# Patient Record
Sex: MALE | Race: OTHER | URBAN - METROPOLITAN AREA
[De-identification: names, ages, dates, MRNs, and addresses within clinical notes are randomized per-mention and may not be internally consistent; named-entity substitution may affect disease eponyms.]

---

## 2018-01-27 ENCOUNTER — EMERGENCY (EMERGENCY)
Facility: HOSPITAL | Age: 43
LOS: 1 days | Discharge: HOME | End: 2018-01-27
Attending: EMERGENCY MEDICINE | Admitting: EMERGENCY MEDICINE
Payer: SELF-PAY

## 2018-01-27 VITALS
HEART RATE: 74 BPM | OXYGEN SATURATION: 100 % | WEIGHT: 147.49 LBS | DIASTOLIC BLOOD PRESSURE: 81 MMHG | TEMPERATURE: 97 F | SYSTOLIC BLOOD PRESSURE: 128 MMHG | RESPIRATION RATE: 18 BRPM

## 2018-01-27 DIAGNOSIS — S61.217A LACERATION WITHOUT FOREIGN BODY OF LEFT LITTLE FINGER WITHOUT DAMAGE TO NAIL, INITIAL ENCOUNTER: ICD-10-CM

## 2018-01-27 DIAGNOSIS — F17.200 NICOTINE DEPENDENCE, UNSPECIFIED, UNCOMPLICATED: ICD-10-CM
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
## 2018-01-27 PROCEDURE — 90715 TDAP VACCINE 7 YRS/> IM: CPT

## 2018-01-27 PROCEDURE — 99283 EMERGENCY DEPT VISIT LOW MDM: CPT | Mod: 25

## 2018-01-27 PROCEDURE — 73140 X-RAY EXAM OF FINGER(S): CPT | Mod: 26,LT

## 2018-01-27 PROCEDURE — 99284 EMERGENCY DEPT VISIT MOD MDM: CPT

## 2018-01-27 PROCEDURE — 90471 IMMUNIZATION ADMIN: CPT

## 2018-01-27 PROCEDURE — 73140 X-RAY EXAM OF FINGER(S): CPT

## 2018-01-27 RX ORDER — IBUPROFEN 200 MG
600 TABLET ORAL ONCE
Qty: 0 | Refills: 0 | Status: COMPLETED | OUTPATIENT
Start: 2018-01-27 | End: 2018-01-27

## 2018-01-27 RX ORDER — TETANUS TOXOID, REDUCED DIPHTHERIA TOXOID AND ACELLULAR PERTUSSIS VACCINE, ADSORBED 5; 2.5; 8; 8; 2.5 [IU]/.5ML; [IU]/.5ML; UG/.5ML; UG/.5ML; UG/.5ML
0.5 SUSPENSION INTRAMUSCULAR ONCE
Qty: 0 | Refills: 0 | Status: COMPLETED | OUTPATIENT
Start: 2018-01-27 | End: 2018-01-27

## 2018-01-27 RX ORDER — IBUPROFEN 200 MG
1 TABLET ORAL
Qty: 20 | Refills: 0 | OUTPATIENT
Start: 2018-01-27

## 2018-01-27 RX ADMIN — TETANUS TOXOID, REDUCED DIPHTHERIA TOXOID AND ACELLULAR PERTUSSIS VACCINE, ADSORBED 0.5 MILLILITER(S): 5; 2.5; 8; 8; 2.5 SUSPENSION INTRAMUSCULAR at 10:24

## 2018-01-27 RX ADMIN — Medication 600 MILLIGRAM(S): at 10:24

## 2018-01-27 RX ADMIN — Medication 1 TABLET(S): at 10:24

## 2018-01-27 NOTE — ED PROVIDER NOTE - PHYSICAL EXAMINATION
GEN: Well appearing, well nourished, awake, alert, oriented to person, place, time/situation and in no apparent distress.  ENT: Airway patent, Nasal mucosa clear. Mouth with normal mucosa.  EYES: Clear bilaterally.  RESPIRATORY: Breathing comfortably with normal RR.  CARDIAC: Regular rate and rhythm  ABDOMEN: Soft, nontender,    MSK: Range of motion is not limited, no deformities noted. Clubbed fingertips. Flexion and ext intact in L pinky. No TTP over flexor tendon.   NEURO: Alert and oriented x 3. Cn 2-12 intact. Strength 5/5 and sensation intact in all 4 extremities. Gait normal.   SKIN: Skin normal color for race, Y-shaped laceration to volar and medial aspect of distal left pinky finger with STS, sensation intact distally and to medial and lateral aspect of finger, no crepitus, gangrene, bleeding or discharge, no visible tendon lacerations.   PSYCH: Alert and oriented to person, place, time/situation. normal mood and affect. no apparent risk to self or others.

## 2018-01-27 NOTE — ED ADULT NURSE NOTE - OBJECTIVE STATEMENT
44 y/o male c/o laceration to left pinky after working with metal last night. Patient does not know last tetanus shot. Patient denies fever, chills, n/v.

## 2018-01-27 NOTE — ED PROVIDER NOTE - MEDICAL DECISION MAKING DETAILS
43M with left pinky laceration described above sustain > 24hrs ago. Wound soaked in water, hydrogen peroxide and betadine and gauze removed. D/w on call hand Dr. Lewis. No indication for wound closure due to contamination and risk of infection, flexor tendon appears intact and no evidence of flexor tenosynovitis. Plan: hydrogen peroxide finger swirl/rinse 1X/day, nonadherant dressing, augmentin x 10 days (pt denies h/o mrsa or recent hsopitalization, no indication for mrsa coverage at this time), XR to r/o fracture, close f/u with hand, return to ED if sx worsen. The patient is stable for DC. They were advised to call hand surgery for prompt outpatient follow up. Return precautions were discussed. The patient was advised to return to the ER for any concerning or worsening symptoms.

## 2018-01-27 NOTE — ED PROVIDER NOTE - OBJECTIVE STATEMENT
43M with no sig PMHX, 1PPD smoker, RHD, who p/w left pinky finger laceration sustained over 24 hrs while he was cutting wood with a metal saw. Pt was with a friend, who put unknown powdered on it to stop bleeding and then wrapped it tightly in gauze. Today, pt was unable to get the hardened gauze off finger so came to ED. No n/t/w of finger, pt can flex/extend pinky. No other injury. uknown tdap. 43M with no sig PMHX, 1PPD smoker, RHD, who p/w left pinky finger laceration sustained over 24 hrs while he was cutting wood with a metal saw. Pt was with a friend, who put unknown powdered on it to stop bleeding and then wrapped it tightly in gauze. Today, pt was unable to get the hardened gauze off finger so came to ED. No n/t/w of finger, pt can flex/extend pinky. No other injury. unknown tdap.